# Patient Record
Sex: FEMALE | Race: WHITE | NOT HISPANIC OR LATINO | Employment: OTHER | URBAN - METROPOLITAN AREA
[De-identification: names, ages, dates, MRNs, and addresses within clinical notes are randomized per-mention and may not be internally consistent; named-entity substitution may affect disease eponyms.]

---

## 2017-02-01 RX ORDER — CITALOPRAM 20 MG/1
TABLET ORAL
Qty: 30 TAB | Refills: 0 | OUTPATIENT
Start: 2017-02-01

## 2017-02-01 NOTE — TELEPHONE ENCOUNTER
Was the patient seen in the last year in this department? Yes     Does patient have an active prescription for medications requested? Yes     Received Request Via: Pharmacy    Last office visit: 05/13/2016  Last labs: 05/04/2016

## 2017-02-03 DIAGNOSIS — I10 ESSENTIAL HYPERTENSION: ICD-10-CM

## 2017-02-03 RX ORDER — ENALAPRIL MALEATE 5 MG/1
5 TABLET ORAL DAILY
Qty: 30 TAB | Refills: 0 | Status: SHIPPED | OUTPATIENT
Start: 2017-02-03

## 2017-02-03 NOTE — TELEPHONE ENCOUNTER
Was the patient seen in the last year in this department? Yes [email or web address]    Does patient have an active prescription for medications requested? No     Received Request Via: Pharmacy

## 2017-05-22 ENCOUNTER — TELEPHONE (OUTPATIENT)
Dept: MEDICAL GROUP | Facility: MEDICAL CENTER | Age: 67
End: 2017-05-22

## 2017-05-22 NOTE — TELEPHONE ENCOUNTER
Called patient to inform about medication being denied because an appt is needed and the number in the chart is disconnected

## 2017-06-06 DIAGNOSIS — I10 ESSENTIAL HYPERTENSION: ICD-10-CM

## 2017-06-06 RX ORDER — ENALAPRIL MALEATE 5 MG/1
5 TABLET ORAL DAILY
Qty: 30 TAB | OUTPATIENT
Start: 2017-06-06